# Patient Record
Sex: FEMALE | Race: WHITE | NOT HISPANIC OR LATINO | Employment: FULL TIME | URBAN - METROPOLITAN AREA
[De-identification: names, ages, dates, MRNs, and addresses within clinical notes are randomized per-mention and may not be internally consistent; named-entity substitution may affect disease eponyms.]

---

## 2023-04-21 ENCOUNTER — OFFICE VISIT (OUTPATIENT)
Dept: URGENT CARE | Facility: URGENT CARE | Age: 43
End: 2023-04-21
Payer: COMMERCIAL

## 2023-04-21 ENCOUNTER — ANCILLARY PROCEDURE (OUTPATIENT)
Dept: GENERAL RADIOLOGY | Facility: CLINIC | Age: 43
End: 2023-04-21
Attending: FAMILY MEDICINE
Payer: COMMERCIAL

## 2023-04-21 VITALS
OXYGEN SATURATION: 99 % | DIASTOLIC BLOOD PRESSURE: 80 MMHG | SYSTOLIC BLOOD PRESSURE: 140 MMHG | HEART RATE: 85 BPM | TEMPERATURE: 98.3 F

## 2023-04-21 DIAGNOSIS — Z72.0 TOBACCO USE: ICD-10-CM

## 2023-04-21 DIAGNOSIS — J02.9 ACUTE PHARYNGITIS, UNSPECIFIED ETIOLOGY: ICD-10-CM

## 2023-04-21 DIAGNOSIS — J45.21 MILD INTERMITTENT REACTIVE AIRWAY DISEASE WITH ACUTE EXACERBATION: Primary | ICD-10-CM

## 2023-04-21 DIAGNOSIS — R05.1 ACUTE COUGH: ICD-10-CM

## 2023-04-21 DIAGNOSIS — R68.89 FLU-LIKE SYMPTOMS: ICD-10-CM

## 2023-04-21 DIAGNOSIS — Z20.822 SUSPECTED 2019 NOVEL CORONAVIRUS INFECTION: ICD-10-CM

## 2023-04-21 DIAGNOSIS — J45.21 MILD INTERMITTENT REACTIVE AIRWAY DISEASE WITH ACUTE EXACERBATION: ICD-10-CM

## 2023-04-21 LAB
DEPRECATED S PYO AG THROAT QL EIA: NEGATIVE
FLUAV AG SPEC QL IA: NEGATIVE
FLUBV AG SPEC QL IA: NEGATIVE

## 2023-04-21 PROCEDURE — 71046 X-RAY EXAM CHEST 2 VIEWS: CPT | Mod: TC | Performed by: RADIOLOGY

## 2023-04-21 PROCEDURE — 87804 INFLUENZA ASSAY W/OPTIC: CPT | Performed by: FAMILY MEDICINE

## 2023-04-21 PROCEDURE — 87651 STREP A DNA AMP PROBE: CPT | Performed by: FAMILY MEDICINE

## 2023-04-21 PROCEDURE — 99203 OFFICE O/P NEW LOW 30 MIN: CPT | Mod: CS | Performed by: FAMILY MEDICINE

## 2023-04-21 PROCEDURE — U0005 INFEC AGEN DETEC AMPLI PROBE: HCPCS | Performed by: FAMILY MEDICINE

## 2023-04-21 PROCEDURE — U0003 INFECTIOUS AGENT DETECTION BY NUCLEIC ACID (DNA OR RNA); SEVERE ACUTE RESPIRATORY SYNDROME CORONAVIRUS 2 (SARS-COV-2) (CORONAVIRUS DISEASE [COVID-19]), AMPLIFIED PROBE TECHNIQUE, MAKING USE OF HIGH THROUGHPUT TECHNOLOGIES AS DESCRIBED BY CMS-2020-01-R: HCPCS | Performed by: FAMILY MEDICINE

## 2023-04-21 RX ORDER — PREDNISONE 20 MG/1
40 TABLET ORAL DAILY
Qty: 10 TABLET | Refills: 0 | Status: SHIPPED | OUTPATIENT
Start: 2023-04-21 | End: 2023-04-26

## 2023-04-21 RX ORDER — ALBUTEROL SULFATE 90 UG/1
2 AEROSOL, METERED RESPIRATORY (INHALATION) EVERY 6 HOURS
Qty: 18 G | Refills: 0 | Status: SHIPPED | OUTPATIENT
Start: 2023-04-21

## 2023-04-21 RX ORDER — DOXYCYCLINE HYCLATE 100 MG
100 TABLET ORAL 2 TIMES DAILY
Qty: 20 TABLET | Refills: 0 | Status: SHIPPED | OUTPATIENT
Start: 2023-04-21 | End: 2023-05-01

## 2023-04-21 RX ORDER — BENZONATATE 200 MG/1
200 CAPSULE ORAL 3 TIMES DAILY PRN
Qty: 30 CAPSULE | Refills: 0 | Status: SHIPPED | OUTPATIENT
Start: 2023-04-21

## 2023-04-21 NOTE — PROGRESS NOTES
ASSESSMENT/PLAN:      ICD-10-CM    1. Mild intermittent reactive airway disease with acute exacerbation  J45.21 XR Chest 2 Views     albuterol (PROAIR HFA/PROVENTIL HFA/VENTOLIN HFA) 108 (90 Base) MCG/ACT inhaler     benzonatate (TESSALON) 200 MG capsule     doxycycline hyclate (VIBRA-TABS) 100 MG tablet     predniSONE (DELTASONE) 20 MG tablet      2. Acute cough  R05.1 XR Chest 2 Views      3. Tobacco use  Z72.0     20+ pk year smoker, no vape,       4. Acute pharyngitis, unspecified etiology  J02.9 Streptococcus A Rapid Screen w/Reflex to PCR - Clinic Collect     Group A Streptococcus PCR Throat Swab      5. Flu-like symptoms  R68.89 Influenza A & B Antigen - Clinic Collect      6. Suspected 2019 novel coronavirus infection  Z20.822 Symptomatic COVID-19 Virus (Coronavirus) by PCR Nose                Reviewed medication instructions and side effects. Follow up if experiences side effects.     I reviewed supportive care, otc meds to use if needed, expected course, and signs of concern.  Follow up as needed or if she does not improve within  1-2 days or if worsens in any way.  Reviewed red flag symptoms and is to go to the ER if experiences any of these.     The use of [employer] dictation services may have been used to construct the content in this note; any grammatical or spelling errors are non-intentional. Please contact the author of this note directly if you are in need of any clarification.      On the day of the encounter, time spend on chart review, patient visit, review of testing, documentation was 30  minutes          Patient Instructions     Start doxycycline 2 times a day for 10 days always take with food to prevent nausea vomiting     Take tessalon perles ( benzonatate) pills for cough up to 3 times a day will not make you sleepy     Start albuterol inhaler 2 puffs in a.m. and bedtime and every 4-6 hours as needed for wheezing  if you are having a coughing spell you can take 2 puffs as needed  to help decrease the cough      Start prednisone 2 tablet daily for 5 days, first dose in am, will keep you awake all night if you take them at bedtime    Start Claritin 10 mg a day for 30 days     Take benadryl ( diphenhydramine) at bedtime will help with congestion and cough will make you sleepy      Follow up as needed or if your symptoms worsen in any way.     Follow up with your primary care provider or clinic in about 2-3 days  if your symptoms do not improve                  Patient presents with:  Sick: Patient is a 42 yr old female who presents with concerns of a cough turning into bronchitis. Started with a stuffy nose, congestion worse at night, throat hurts and feels irritated due to cough. No fever, or pain with swallowing. Symptoms present for the last 2 days       Subjective     Lisa De Santiago is a 42 year old female who presents to clinic today for the following health issues:    HPI   Patient with onset of cough congestion, sore throat, chills, myalgias the last 2 days   No fever.  Cough has been worsening and has mild wheezing.  No history of asthma  No nausea vomiting or diarrhea  Patient smokes cigarettes  20+ pack years, denies vaping or smoking marijuana    Not vaccinated for covid  No hx of covid    History reviewed. No pertinent past medical history.  Social History     Tobacco Use     Smoking status: Every Day     Types: Cigarettes     Smokeless tobacco: Never   Vaping Use     Vaping status: Not on file   Substance Use Topics     Alcohol use: Not on file       Current Outpatient Medications   Medication Sig Dispense Refill     albuterol (PROAIR HFA/PROVENTIL HFA/VENTOLIN HFA) 108 (90 Base) MCG/ACT inhaler Inhale 2 puffs into the lungs every 6 hours 18 g 0     benzonatate (TESSALON) 200 MG capsule Take 1 capsule (200 mg) by mouth 3 times daily as needed for cough 30 capsule 0     doxycycline hyclate (VIBRA-TABS) 100 MG tablet Take 1 tablet (100 mg) by mouth 2 times daily for 10 days 20 tablet  0     predniSONE (DELTASONE) 20 MG tablet Take 2 tablets (40 mg) by mouth daily for 5 days 10 tablet 0     PRENATAL MULTIVIT-IRON PO None Entered (Patient not taking: Reported on 4/21/2023)       No Known Allergies          ROS are negative, except as otherwise noted HPI      Objective    BP (!) 140/80 (BP Location: Right arm, Patient Position: Chair, Cuff Size: Adult Regular)   Pulse 85   Temp 98.3  F (36.8  C) (Tympanic)   SpO2 99%   There is no height or weight on file to calculate BMI.  Physical Exam     GENERAL: fatigued  Alert and oriented x 3.  Minimal distress.  HEENT: Normocephalic, atraumatic. PEERRLA, EOMI.  Scleras, lids and conjunctivae normal. Pinnas clear  Canals clear  and TM's air fluid levels.  Nose congestion and oropharynx moist and posterior pharynx moderately injected   NECK: supple and free of adenopathy   CHEST: Diffuse wheezing, crackles in the right upper lung fields, decreased breath sounds in the right base, no rhonchi or rales.   HEART:  S1 and S2 normal, no murmurs, clicks, gallops or rubs. Regular rate and rhythm.    NEURO:Alert and oriented x3, normal strength and tone, normal gait    Diagnostic Test Results:  Labs reviewed in Epic  Results for orders placed or performed in visit on 04/21/23   XR Chest 2 Views     Status: None    Narrative    EXAM: XR CHEST 2 VIEWS  LOCATION: [facility]  DATE/TIME: 4/21/2023 6:08 PM CDT    INDICATION: Cough and crackles. Decreased breath sounds on the right.  COMPARISON: None.      Impression    IMPRESSION:     No focal airspace disease. No pleural effusion or pneumothorax.    The cardiomediastinal silhouette is unremarkable.    Multilevel degenerative changes of the spine.   Results for orders placed or performed in visit on 04/21/23   Influenza A & B Antigen - Clinic Collect     Status: Normal    Specimen: Nose; Swab   Result Value Ref Range    Influenza A antigen Negative Negative    Influenza B antigen Negative Negative     Narrative    Test results must be correlated with clinical data. If necessary, results should be confirmed by a molecular assay or viral culture.   Streptococcus A Rapid Screen w/Reflex to PCR - Clinic Collect     Status: Normal    Specimen: Throat; Swab   Result Value Ref Range    Group A Strep antigen Negative Negative

## 2023-04-21 NOTE — PATIENT INSTRUCTIONS
Start doxycycline 2 times a day for 10 days always take with food to prevent nausea vomiting     Take tessalon perles ( benzonatate) pills for cough up to 3 times a day will not make you sleepy     Start albuterol inhaler 2 puffs in a.m. and bedtime and every 4-6 hours as needed for wheezing  if you are having a coughing spell you can take 2 puffs as needed to help decrease the cough      Start prednisone 2 tablet daily for 5 days, first dose in am, will keep you awake all night if you take them at bedtime    Start Claritin 10 mg a day for 30 days     Take benadryl ( diphenhydramine) at bedtime will help with congestion and cough will make you sleepy      Follow up as needed or if your symptoms worsen in any way.     Follow up with your primary care provider or clinic in about 2-3 days  if your symptoms do not improve

## 2023-04-22 LAB — GROUP A STREP BY PCR: NOT DETECTED

## 2023-04-23 LAB — SARS-COV-2 RNA RESP QL NAA+PROBE: NEGATIVE

## 2023-06-01 ENCOUNTER — HEALTH MAINTENANCE LETTER (OUTPATIENT)
Age: 43
End: 2023-06-01

## 2024-06-15 ENCOUNTER — HEALTH MAINTENANCE LETTER (OUTPATIENT)
Age: 44
End: 2024-06-15

## 2025-05-31 ENCOUNTER — HEALTH MAINTENANCE LETTER (OUTPATIENT)
Age: 45
End: 2025-05-31

## 2025-06-21 ENCOUNTER — HEALTH MAINTENANCE LETTER (OUTPATIENT)
Age: 45
End: 2025-06-21